# Patient Record
(demographics unavailable — no encounter records)

---

## 2019-07-07 NOTE — RADIOLOGY REPORT (SQ)
EXAM DESCRIPTION:



[facility]Lex: US PREGNANCY LIMITED   



CLINICAL HISTORY: 

25 years  Female;  cramping 

LMP 01/03/2019 (26 weeks 3 days)



TECHNIQUE: 

Transabdominal obstetrical ultrasound was performed.  



COMPARISON: 02/18/2019



FINDINGS:

Number of fetuses: Single

Fetal position: Vertex

BPD: 6.63 cm, 26 weeks 5 days

HC: 24.37 cm, 26 weeks 3 days

AC: 22.05 cm, 26 weeks 3 days

FL: 4.83 cm, 26 weeks 1 day

EFW: 931 g (2 lbs. 1 oz.), 47%

HR: 137 bpm

Anatomy: Grossly normal on this limited exam.

Amniotic fluid: KELLY 9.6 cm, adequate

Cervix: 3.7 cm

Placenta: Posterior fundal. No previa.



IMPRESSION:



1.  Single viable IUP

2.  No acute findings.

3.  EGA 26 weeks 3 days, EDC 10/10/2019

## 2019-08-24 NOTE — NON STRESS TEST REPORT
=================================================================

Non Stress Test

=================================================================

Datetime Report Generated by CPN: 08/24/2019 18:36

   

   

=================================================================

DEMOGRAPHIC

=================================================================

   

EGA NST:  33.2

   

=================================================================

INDICATION

=================================================================

   

Indication for Study:  Ordered by Provider

   

=================================================================

MONITORING

=================================================================

   

Monitor Explained:  Monitor Explained; Test Explained; Patient

   Verbalized Understanding

Time on Monitor:  08/24/2019 17:47

Time off Monitor:  08/24/2019 18:21

NST Duration:  34

   

=================================================================

NST INTERVENTIONS

=================================================================

   

NST Interventions:  None

Physician Notified NST:  Dr Posey-Janes

BABY A:  [id number]

   

=================================================================

BABY A

=================================================================

   

Fetal Movement :  Present

Contraction Frequency :  none

FHR Baseline :  135

Accelerations :  15X15

Decelerations :  None

Variability :  Moderate 6-25bpm

NST Review:  Meets Criteria for Reactive NST

NST Review and Verified By :  JOEY Escudero RN

NST Results:  Reactive

   

=================================================================

NST REPORT

=================================================================

   

Report Trigger:  Send Report

## 2019-10-15 NOTE — ADMISSION PHYSICAL
=================================================================



=================================================================

Datetime Report Generated by CPN: 10/15/2019 12:37

   

   

=================================================================

CURRENT ADMISSION

=================================================================

   

Prenatal Hx Assessment:  The Prenatal History has been Updated

Chief Complaint:  Uterine Contractions

Indication for Induction:  Not Applicable

Admit Impression :  Term, Intrauterine Pregnancy; Active Labor; Intact

   Membranes

Admit Plan:  Admit to Unit; Initiate Labor Protocol

   

=================================================================

ALLERGIES

=================================================================

   

Medication Allergies:  No

Medication Allergies:  No Known Allergies (2019)

Latex:  No Latex Allergies

Food Allergies:  No

Environmental Allergies:  No

   

=================================================================

OBSTETRICAL HISTORY

=================================================================

   

EDC:  10/10/2019 00:00

:  1

Para:  0

Term:  0

:  0

SAB:  0

IAB:  0

Ectopic:  0

Livin

Cesareans:  0

VBACs:  0

Multiple Births:  0

   

=================================================================

***SEE PRENATAL RECORDS***

=================================================================

   

Alcohol:  No

Marijuana :  No

Cocaine:  No

Other Illicit Drugs:  No

Cigarettes:  Never Smoker. [id number]

   

=================================================================

PHYSICAL EXAM

=================================================================

   

General:  Normal

HEENT:  Normal

Neurologic:  Normal

Thyroid:  Normal

Heart:  Normal

Lungs:  Normal

Breast:  Deferred

Back:  Normal

Abdomen:  Normal

Genitourinary Exam:  Normal

Extremities:  Normal

DTRs:  Normal

Pelvic Type:  Adequate

Physical Exam Comments:  G1 admitted to LD in labor,

   + Chlamydia 4-17= MIKA neg 19

   Rt breast lump, Dr. Mcguire, bx B9, surgery pp

   Hx migraines

   High pregnancy weight gain

   GBS neg

Vital Signs:  Reviewed

   

=================================================================

FETUS A

=================================================================

   

EGA:  40.5

Monitoring:  External US

Variability:  Moderate 6-25bpm

Accelerations:  15X15

FHR Category:  Category I

Admit Comment:  Admitted to  in labor, does not plan on epidural, Cat

   1 strip, irreg uc's,

   Plan: ROM anticipate 

   GBS neg

   Family at BS

   

=================================================================

PLANS FOR LABOR AND DELIVERY

=================================================================

   

Labor and Delivery:  None

Pain Management:  Natural; Medications

Feeding Preference:  Breast

Benefit of Breast Feed Discussed:  Yes

Circumcision:  N/A

   

=================================================================

INFORMED CONSENT

=================================================================

   

Assignment:  Tory Melgar MD

Signature:  Electronically signed by Kimberlyn Mills CNM on 10/15/2019 at

   12:36  with User ID: [email or web address]

:  Electronically signed by Kimberlyn Mills CNM on 10/15/2019 at 12:36  with

   User ID: [email or web address]

## 2019-10-15 NOTE — DELIVERY SUMMARY
=================================================================

Del Sum A-C

=================================================================

Datetime Report Generated by CPN: 10/15/2019 21:03

   

   

=================================================================

DELIVERY PERSONNEL

=================================================================

   

DELIVERY PERSONNEL:  [id number]

Delivery Doctor::  Tory Melgar MD

Labor and Delivery Nurse::  Ale Guerin RN

Labor and Delivery Nurse::  Yasmin Flannery RN

Scrub Tech/CNA:  [place] Trve[place], ST

   

=================================================================

MATERNAL INFORMATION

=================================================================

   

Delivery Anesthesia:  Epidural

Medications After Delivery:  Pitocin Drip 20 Units/1000ml NSS

Maternal Complications:  None

Provider Comments:   of a viable female  @ 1940 w/ an JULIET w/

   tight nuchal cord x 2 presentation;  APGARS 8, 9; anjelica 2nd deg

   periurethral and 2nd deg midline vag lac

   

=================================================================

LABOR SUMMARY

=================================================================

   

EDC:  10/10/2019 00:00

No. Babies in Womb:  1

 Attempted:  No

Labor Anesthesia:  Epidural

   

=================================================================

LABOR INFORMATION

=================================================================

   

Reason for Induction:  Not Applicable

Onset of Labor:  10/15/2019 11:42

Complete Dilatation:  10/15/2019 19:11

Oxytocin:  Augmentation

Group B Beta Strep:  Negative

Antibiotics # of Doses:  0

Antibiotics Time of Last Dose:  N/A

Name of Antibiotic Given:  N/A

Steroids Given:  None

Reason Steroids Not Administered:  Not Applicable

   

=================================================================

MEMBRANES

=================================================================

   

Membranes Rupture Method:  Spontaneous

Rupture of Membranes:  10/15/2019 15:10

Length of Rupture (hr):  4.55

Amniotic Fluid Color:  Clear

Amniotic Fluid Amount:  Moderate

Amniotic Fluid Odor:  Normal

   

=================================================================

STAGES OF LABOR

=================================================================

   

Stage 1 hr:  7

Stage 1 min:  29

Stage 2 hr:  0

Stage 2 min:  32

Stage 3 hr:  0

Stage 3 min:  37

Total Time in Labor hr:  8

Total Time in Labor min:  38

   

=================================================================

VAGINAL DELIVERY

=================================================================

   

Episiotomy:  None

Laceration #1:  Periurethral

Laceration Extension #1:  Second Degree

Laceration #2:  Periurethral

Laceration Extension #2:  Second Degree

Laceration #3:  Vaginal

Laceration Extension #3:  Second Degree

Laceration Repair:  Yes

Laceration Repair Note:  Bilateral 2nd deg periurethral lacs repaired

   w/3-0 chromic; 2nd deg midline vag lac repaired 

   w/2-0 vicryl

Sponge Count Correct:  Yes

Sharps Count Correct:  Yes

   

=================================================================

CSECTION DELIVERY

=================================================================

   

Primary Indication:  N/A

Secondary Indication:  N/A

CSection Incidence:  N/A

Labor:  N/A

Elective:  N/A

CSection Incision:  N/A

   

=================================================================

BABY A INFORMATION

=================================================================

   

Infant Delivery Date/Time:  10/15/2019 19:43

Method of Delivery:  Vaginal

Born in Route :  No

:  N/A

Forceps:  N/A

Vacuum Extraction:  N/A

Shoulder Dystocia :  No

   

=================================================================

PRESENTATION/POSITION BABY A

=================================================================

   

Presentation:  Cephalic

Cephalic Presentation:  Vertex

Vertex Position:  Right Occipital Anterior

Breech Presentation:  N/A

   

=================================================================

PLACENTA INFORMATION BABY A

=================================================================

   

Placenta Delivery Time :  10/15/2019 20:20

Placenta Method of Delivery:  Spontaneous

Placenta Status:  Delivered

   

=================================================================

APGAR SCORES BABY A

=================================================================

   

Heart Rate 1 min:  >100 bpm

Resp Effort 1 min:  Good Cry

Reflex Irritability 1 min:  Cough or Sneeze or Pulls Away

Muscle Tone 1 min:  Active Motion

Color 1 min:  Blue/Pale

APGAR SCORE 1 MIN:  8

Heart Rate 5 min:  >100 bpm

Resp Effort 5 min:  Good Cry

Reflex Irritability 5 min:  Cough or Sneeze or Pulls Away

Muscle Tone 5 min:  Active Motion

Color 5 min:  Body Pink, Extremities Blue

APGAR SCORE 5 MIN:  9

   

=================================================================

INFANT INFORMATION BABY A

=================================================================

   

Gestational Age at Delivery:  40.5

Gestational Status:  Full Term- 39- 40.6 Weeks

Infant Outcome :  Liveborn

Infant Condition :  Stable

Infant Sex:  Female

   

=================================================================

IDENTIFICATION BABY A

=================================================================

   

Infant Verification Date/Time:  10/15/2019 19:53

ID Band Number:  [id number]

Mother's Name Verified:  Yes

Infant Medical Record Number:  [id number]

RN Verifying Infant:  JUDI FlanneryNORA

Additional Verifying Personnel:  CHAO Keller, US

   

=================================================================

WEIGHT/LENGTH BABY A

=================================================================

   

Infant Birthweight (gm):  3473

Infant Weight (lb):  7

Infant Weight (oz):  11

Infant Length (in):  20.00

Infant Length (cm):  50.80

   

=================================================================

CORD INFORMATION BABY A

=================================================================

   

No. Cord Vessels:  3

Nuchal Cord :  Around Neck x2, Tight

Cord Blood Taken:  Yes-For Eval (Mom's Blood Type - or O+)

   

=================================================================

ASSESSMENT BABY A

=================================================================

   

Skin to Skin:  Yes

   

=================================================================

BABY B INFORMATION

=================================================================

   

 :  N/A

   

=================================================================

SIGNATURES

=================================================================

   

Signature:  Electronically signed by Tory Younger Eure, MD ([email or web address]) on

   10/15/2019 at 20:57  with User ID: [email or web address]

## 2019-10-15 NOTE — NON STRESS TEST REPORT
=================================================================

Non Stress Test

=================================================================

Datetime Report Generated by CPN: 10/15/2019 11:06

   

   

=================================================================

DEMOGRAPHIC

=================================================================

   

EGA NST:  40.5

   

=================================================================

INDICATION

=================================================================

   

Indication for Study:  Ordered by Provider

Indication for Study (NST) Other:  LABOR CHECK

   

=================================================================

MONITORING

=================================================================

   

Monitor Explained:  Monitor Explained; Test Explained; Patient

   Verbalized Understanding

Time on Monitor:  10/15/2019 10:18

Time on Monitor:  10/15/2019 10:17

Time off Monitor:  10/15/2019 10:47

NST Duration:  29

   

=================================================================

NST INTERVENTIONS

=================================================================

   

NST Interventions:  PO Hydration

Physician Notified NST:  J. Mills, CNM

BABY A:  [id number]

   

=================================================================

BABY A

=================================================================

   

Fetal Movement :  Present

Contraction Frequency :  Occasional

FHR Baseline :  135

Accelerations :  15X15

Decelerations :  Variable

Variability :  Moderate 6-25bpm

NST Review:  Meets Criteria for Reactive NST

NST Review and Verified By :  NORA NICOLE Results:  Reactive

   

=================================================================

NST REPORT

=================================================================

   

Report Trigger:  Send Report

## 2019-10-16 NOTE — PDOC PROGRESS REPORT
Subjective-OB


Progress Note for:: 10/16/19


Subjective: 


26yo G1 now P1 s/p  ppd1. Ambulating,breastfeeding and voiding without 

difficulty. Reports pain well controlled with medication. Denies any concerns 

today





Physical Exam (OB)


Vital Signs: 


                                        











Temp Pulse Resp BP Pulse Ox


 


 98.6 F   89   16   115/71   99 


 


 10/16/19 08:35  10/16/19 08:35  10/16/19 08:35  10/16/19 08:35  10/16/19 08:35








                                 Intake & Output











 10/15/19 10/16/19 10/17/19





 06:59 06:59 06:59


 


Intake Total  873 1000


 


Balance  873 1000


 


Weight  102.3 kg 














- General


General Appearance: Appears well


In distress: None





- PIH/Pre-Eclampsia


Headache: Absent


Epigastric Pain: No


Visual Changes: No





- Episiotomy/Laceration


Site Condition: Well Approximated





- Lochia


Lochia Amount: Small 10-25 ml





- Abdomen


Description: Soft





- Respiratory


Respiratory Status: No respiratory distress





- Extremities


Upper extremity: Normal inspection


Lower extremities: Normal inspection





- Neurological


Cognition: Normal


Orientation: AAOx4





- Psychological


Associated symptoms: Normal affect, Normal mood





Objective-Diagnostic


Laboratory: 


                                        





                                 10/16/19 08:08 





                                        











  10/15/19 10/15/19 10/15/19





  09:55 13:02 13:02


 


WBC   10.9 H 


 


RBC   4.52 


 


Hgb   13.4 


 


Hct   39.6 


 


MCV   88 


 


MCH   29.6 


 


MCHC   33.8 


 


RDW   13.4 


 


Plt Count   202 


 


Seg Neutrophils %   78.8 H 


 


Urine Color  YELLOW  


 


Urine Appearance  SLIGHTLY-CLOUDY  


 


Urine pH  6.0  


 


Ur Specific Gravity  1.014  


 


Urine Protein  30 H  


 


Urine Glucose (UA)  NEGATIVE  


 


Urine Ketones  NEGATIVE  


 


Urine Blood  LARGE H  


 


Urine Nitrite  NEGATIVE  


 


Ur Leukocyte Esterase  MODERATE H  


 


Blood Type    O POSITIVE


 


Antibody Screen    NEGATIVE














  10/16/19





  08:08


 


WBC  15.5 H


 


RBC  4.06


 


Hgb  11.8 L


 


Hct  35.3 L


 


MCV  87


 


MCH  29.0


 


MCHC  33.3


 


RDW  13.5


 


Plt Count  202


 


Seg Neutrophils % 


 


Urine Color 


 


Urine Appearance 


 


Urine pH 


 


Ur Specific Gravity 


 


Urine Protein 


 


Urine Glucose (UA) 


 


Urine Ketones 


 


Urine Blood 


 


Urine Nitrite 


 


Ur Leukocyte Esterase 


 


Blood Type 


 


Antibody Screen 














Assessment and Plan(PN)





- Assessment and Plan


(1) Vaginal delivery


Is this a current diagnosis for this admission?: Yes   


Plan: 


Routine pp care








(2) Periurethral laceration, delivered, current hospitalization


Is this a current diagnosis for this admission?: Yes   


Plan: 


 continue to monitor for s/s of infection








(3) Obstetric vaginal laceration


Qualifiers: 


   Perineal laceration presence: without perineal laceration   Qualified 

Code(s): O71.4 - Obstetric high vaginal laceration alone   


Is this a current diagnosis for this admission?: Yes   


Plan: 


continue to monitor for s/s of infection 








- Time Spent with Patient


Time with patient: Less than 15 minutes


Medications reviewed and adjusted accordingly: Yes





- Disposition


Anticipated Discharge: Home


Within: within 24 hours

## 2019-10-17 NOTE — PDOC PROGRESS REPORT
Subjective-OB


Progress Note for:: 10/17/19


Subjective: 





Doing well, no c/o, breastfeeding, packing to go home, baby in room





Physical Exam (OB)


Vital Signs: 


                                        











Temp Pulse Resp BP Pulse Ox


 


 98.0 F   89   16   104/62   100 


 


 10/17/19 09:01  10/17/19 09:01  10/17/19 09:01  10/17/19 09:01  10/17/19 09:01








                                 Intake & Output











 10/16/19 10/17/19 10/18/19





 06:59 06:59 06:59


 


Intake Total 873 1000 


 


Balance 873 1000 


 


Weight 102.3 kg  














- PIH/Pre-Eclampsia


Headache: Absent


Epigastric Pain: No


Visual Changes: No





- Lochia


Lochia Amount: Scant < 10 ml


Lochia Color: Rubra/Red





- Abdomen


Description: Soft


Hernia Present: No


Fundal Description: Firm, Midline


Fundal Height: u/u - u/2





Objective-Diagnostic


Laboratory: 


                                        





                                 10/16/19 08:08 











Assessment and Plan(PN)





- Assessment and Plan


(1) Vaginal delivery


Is this a current diagnosis for this admission?: Yes   





(2) Periurethral laceration, delivered, current hospitalization


Is this a current diagnosis for this admission?: Yes   





(3) Obstetric vaginal laceration


Qualifiers: 


   Perineal laceration presence: without perineal laceration   Qualified 

Code(s): O71.4 - Obstetric high vaginal laceration alone   


Is this a current diagnosis for this admission?: Yes   





- Time Spent with Patient


Time with patient: Less than 15 minutes


Medications reviewed and adjusted accordingly: Yes





- Disposition


Anticipated Discharge: Home


Within: within 24 hours

## 2019-10-17 NOTE — PDOC DISCHARGE SUMMARY
Impression





- Admit/DC Date/PCP


Admission Date/Primary Care Provider: 


  10/15/19 11:58





  MAURO SPEARS, DO





Discharge Date: 10/17/19





- Discharge Diagnosis


(1) Vaginal delivery


Is this a current diagnosis for this admission?: Yes   





(2) Periurethral laceration, delivered, current hospitalization


Is this a current diagnosis for this admission?: Yes   





(3) Obstetric vaginal laceration


Is this a current diagnosis for this admission?: Yes   





- Additional Information


Resuscitation Status: Full Code


Discharge Diet: Regular


Discharge Activity: Activity As Tolerated, No Lifting Over 10 Pounds, No tub 

bath


[facility] 


[facility] (Please call and schedule a 4 week 

follow up. )


Home Medications: 








Pnv No.95/Ferrous Fum/Folic AC [Prenatal Formula Tablet] 1 tab PO DAILY 07/07/19













HPI


Gestational Age: 40.5


Reason(s) for Admission: Onset of Labor


Prenatal Procedures: NST, Ultrasound


Prenatal Procedure(s) Note: 





Pitocin augmentation


Intrapartum Procedure(s): Spontaneous Vaginal Delivery


Postpartum Complication(s): Laceration-Vaginal, Laceration-Periurethral


Laceration-Degree: 2nd





Hospital Course


Hospital Course: 


routine





Results


Laboratory Results: 


                                        











WBC  15.5 10^3/uL (4.0-10.5)  H  10/16/19  08:08    


 


RBC  4.06 10^6/uL (3.72-5.28)   10/16/19  08:08    


 


Hgb  11.8 g/dL (12.0-15.5)  L  10/16/19  08:08    


 


Hct  35.3 % (36.0-47.0)  L  10/16/19  08:08    


 


MCV  87 fl (80-97)   10/16/19  08:08    


 


MCH  29.0 pg (27.0-33.4)   10/16/19  08:08    


 


MCHC  33.3 g/dL (32.0-36.0)   10/16/19  08:08    


 


RDW  13.5 % (11.5-14.0)   10/16/19  08:08    


 


Plt Count  202 10^3/uL (150-450)   10/16/19  08:08    


 


Lymph % (Auto)  15.9 % (13-45)   10/15/19  13:02    


 


Mono % (Auto)  4.1 % (3-13)   10/15/19  13:02    


 


Eos % (Auto)  0.9 % (0-6)   10/15/19  13:02    


 


Baso % (Auto)  0.3 % (0-2)   10/15/19  13:02    


 


Absolute Neuts (auto)  8.6 10^3/uL (1.7-8.2)  H  10/15/19  13:02    


 


Absolute Lymphs (auto)  1.7 10^3/uL (0.5-4.7)   10/15/19  13:02    


 


Absolute Monos (auto)  0.4 10^3/uL (0.1-1.4)   10/15/19  13:02    


 


Absolute Eos (auto)  0.1 10^3/uL (0.0-0.6)   10/15/19  13:02    


 


Absolute Basos (auto)  0.0 10^3/uL (0.0-0.2)   10/15/19  13:02    


 


Seg Neutrophils %  78.8 % (42-78)  H  10/15/19  13:02    


 


Urine Color  YELLOW   10/15/19  09:55    


 


Urine Appearance  SLIGHTLY-CLOUDY   10/15/19  09:55    


 


Urine pH  6.0  (5.0-9.0)   10/15/19  09:55    


 


Ur Specific Gravity  1.014   10/15/19  09:55    


 


Urine Protein  30 mg/dL (NEGATIVE)  H  10/15/19  09:55    


 


Urine Glucose (UA)  NEGATIVE mg/dL (NEGATIVE)   10/15/19  09:55    


 


Urine Ketones  NEGATIVE mg/dL (NEGATIVE)   10/15/19  09:55    


 


Urine Blood  LARGE  (NEGATIVE)  H  10/15/19  09:55    


 


Urine Nitrite  NEGATIVE  (NEGATIVE)   10/15/19  09:55    


 


Urine Bilirubin  NEGATIVE  (NEGATIVE)   10/15/19  09:55    


 


Urine Urobilinogen  NEGATIVE mg/dL (<2.0)   10/15/19  09:55    


 


Ur Leukocyte Esterase  MODERATE  (NEGATIVE)  H  10/15/19  09:55    


 


Urine Ascorbic Acid  NEGATIVE  (NEGATIVE)   10/15/19  09:55    


 


Urine Opiates Screen  NEGATIVE   10/15/19  09:55    


 


Urine Methadone Screen  NEGATIVE   10/15/19  09:55    


 


Ur Barbiturates Screen  NEGATIVE   10/15/19  09:55    


 


Ur Phencyclidine Scrn  NEGATIVE   10/15/19  09:55    


 


Ur Amphetamines Screen  NEGATIVE   10/15/19  09:55    


 


U Benzodiazepines Scrn  NEGATIVE   10/15/19  09:55    


 


Urine Cocaine Screen  NEGATIVE   10/15/19  09:55    


 


U Marijuana (THC) Screen  NEGATIVE   10/15/19  09:55    


 


RPR  NONREACTIVE  (NONREACTIVE)   10/15/19  13:02    


 


Blood Type  O POSITIVE   10/15/19  13:02    


 


Antibody Screen  NEGATIVE   10/15/19  13:02    














Plan


Health Concerns: 


none, resume PNV's


Plan of Treatment: 


routine


Goals: 


baby home with pt


Time Spent: Less than 30 Minutes

## 2020-12-14 NOTE — ER DOCUMENT REPORT
ED Medical Screen (RME)





- General


Chief Complaint: Abdominal Pain


Stated Complaint: NAUSEA


Time Seen by Provider: 12/14/20 17:05


TRAVEL OUTSIDE OF THE [place] IN LAST 30 DAYS: No





- HPI


Notes: 





Patient is a 26-year-old female who is 4 weeks pregnant and presents with 

abdominal cramping.  Patient reports nausea and vomiting but denies vaginal 

bleeding, vaginal discharge, chest pain, shortness of breath.  Last menstrual 

period was 11/15/2020.  G2, P1.  She has not seen OB for this pregnancy yet.








- Related Data


Allergies/Adverse Reactions: 


                                        





No Known Allergies Allergy (Verified 12/14/20 17:10)


   








Home Medications: prenatal





Past Medical History





- Social History


Frequency of alcohol use: None


Drug Abuse: Marijuana


Pulmonary Medical History: 


   Denies: Hx Asthma


Neurological Medical History: Reports: Hx Migraine


Renal/ Medical History: Denies: Hx Peritoneal Dialysis


GI Medical History: Denies: Hx Gastritis, Hx Gastroesophageal Reflux Disease


Musculoskeltal Medical History: Denies Hx Arthritis, Denies Hx Fibromyalgia, 

Denies Hx Muscular Dystrophy, Denies Hx Musculoskeletal Trauma


Psychiatric Medical History: 


   Denies: Hx Anxiety


Past Surgical History: Reports: Hx Oral Surgery - wisdom teeth removed





- Immunizations


Immunizations up to date: Yes


Hx Diphtheria, Pertussis, Tetanus Vaccination: Yes - 2012





Physical Exam





- Vital signs


Vitals: 





                                        











Temp Pulse Resp BP Pulse Ox


 


 98.5 F   89   20   130/80 H  98 


 


 12/14/20 16:12  12/14/20 16:12  12/14/20 16:12  12/14/20 16:12  12/14/20 16:12














- Abdominal


Distension: No distension


Tenderness: Nontender





Course





- Re-evaluation


Re-evalutation: 





I have greeted and performed a rapid initial assessment of this patient.  A 

comprehensive ED assessment and evaluation of the patient, analysis of test 

results and completion of medical decision making process will be conducted by 

an additional ED providers.





- Vital Signs


Vital signs: 





                                        











Temp Pulse Resp BP Pulse Ox


 


 98.5 F   89   20   130/80 H  98 


 


 12/14/20 16:12  12/14/20 16:12  12/14/20 16:12  12/14/20 16:12  12/14/20 16:12

## 2020-12-14 NOTE — RADIOLOGY REPORT (SQ)
EXAM DESCRIPTION:

 U/S OB TRANSVAG W/DOPPLER

[facility]: US PREGNANCY TRANSVAGINAL 



CLINICAL HISTORY: 

26 years  Female;  abd pain preg location unknown; beta-hCG 2320



TECHNIQUE: 

Endovaginal pelvic ultrasound was performed. 



COMPARISON: None.



FINDINGS:

Uterus: 10 x 5 x 5 cm. 

There is a very small cystic structure in the endometrial canal,

mean diameter 0.39 cm, 5 weeks 1 days if this is a gestational

sac. No fetal pole or yolk sac is identified. No hematoma.

Cervix 3 cm, closed



Right ovary: 3 x 2 x 2 cm. Normal vascular flow on Doppler.

Left ovary: 4 x 2 x 2 cm. Anechoic with structure with internal

echoes typical for corpus luteum cyst is noted. Normal vascular

flow on Doppler.

No free fluid. No adnexal masses. 



IMPRESSION:



1.  Possible intrauterine gestational sac, 5 weeks 1 day. No

fetal pole or yolk sac is identified.

2.  No adnexal masses

3.  Consider two-week follow-up

## 2020-12-14 NOTE — ER DOCUMENT REPORT
ED General





- General


Chief Complaint: Abdominal Pain


Stated Complaint: NAUSEA


Time Seen by Provider: 12/14/20 17:05


Notes: 





26-year-old female G2, P1 LMP 11/15/2020 presents with 1 day of intermittent 

mild left pelvic pain without associated symptoms.  Patient has been seen by OB 

but has not yet had an ultrasound confirming location of pregnancy.  Patient's 

previous pregnancy was uncomplicated.  Patient also endorses having had some 

mild nausea over the last few weeks but has not been vomiting and has been 

tolerating p.o. without difficulty.  Patient denies any vaginal bleeding, 

vaginal discharge, urinary symptoms, fever, lightheadedness, weakness, fainting,

ovarian cyst/STD hx, other GYN history


TRAVEL OUTSIDE OF THE [place] IN LAST 30 DAYS: No





- Related Data


Allergies/Adverse Reactions: 


                                        





No Known Allergies Allergy (Verified 12/14/20 17:10)


   








Home Medications: prenatal





Past Medical History





- General


Information source: Patient





- Social History


Smoking Status: Former Smoker


Frequency of alcohol use: None


Drug Abuse: Marijuana


Family History: Reviewed & Not Pertinent


Patient has homicidal ideation: No


Pulmonary Medical History: 


   Denies: Hx Asthma


Neurological Medical History: Reports: Hx Migraine


Renal/ Medical History: Denies: Hx Peritoneal Dialysis


GI Medical History: Denies: Hx Gastritis, Hx Gastroesophageal Reflux Disease


Musculoskeletal Medical History: Denies Hx Arthritis, Denies Hx Fibromyalgia, 

Denies Hx Muscular Dystrophy, Denies Hx Musculoskeletal Trauma


Psychiatric Medical History: 


   Denies: Hx Anxiety


Past Surgical History: Reports: Hx Oral Surgery - wisdom teeth removed





- Immunizations


Immunizations up to date: Yes


Hx Diphtheria, Pertussis, Tetanus Vaccination: Yes - 2012





Review of Systems





- Review of Systems


Notes: 





REVIEW OF SYSTEMS:


CONSTITUTIONAL :  Denies fever,  chills, or sweats. 


EENT: Denies recent cold/sinus symptoms, denies throat pain


CARDIOVASCULAR:  Denies chest pain, CHACHA


RESPIRATORY:  Denies cough, denies shortness of breath.


GASTROINTESTINAL:  Denies abdominal pain, +nausea -vomiting.


GENITOURINARY:  Denies difficulty urinating, painful urination.


FEMALE  GENITOURINARY:  Denies abnormal vaginal bleeding, vaginal discharge.


MUSCULOSKELETAL:  Denies neck pain, back pain.


SKIN:   Denies rash or skin lesions.


HEMATOLOGIC :   Denies easy bruising or bleeding.


LYMPHATIC:  Denies swollen, enlarged glands.


NEUROLOGICAL:  Denies headache, denies change in gait.


PSYCHIATRIC:  Denies anxiety or stress or depression.





Physical Exam





- Vital signs


Vitals: 





                                        











Temp Pulse Resp BP Pulse Ox


 


 98.5 F   89   20   130/80 H  98 


 


 12/14/20 16:12  12/14/20 16:12  12/14/20 16:12  12/14/20 16:12  12/14/20 16:12














- Notes


Notes: 





PHYSICAL EXAMINATION:


 


GENERAL: Well-appearing, well-nourished and in no acute distress.


 


HEAD: Atraumatic, normocephalic.


 


EYES: Pupils equal round and appropriate constriction, sclera anicteric, 

conjunctiva are normal.


 


ENT: nares patent, moist mucous membranes.


 


NECK: Normal range of motion, supple without lymphadenopathy


 


LUNGS: Breath sounds clear to auscultation bilaterally and equal.  No wheezes 

rales or rhonchi.


 


HEART: Regular rate and rhythm without murmurs


 


ABDOMEN: Soft, nontender, no guarding, no masses, no CVAT.  Mild left pelvic 

discomfort with deep palpation without any masses, rebound, or guarding.  

Patient declined pelvic exam.


 


EXTREMITIES: Normal range of motion, no pitting or edema.  No cyanosis.


 


NEUROLOGICAL: Awake, alert, conversing appropriately, moves all extremities 

spontaneously.


 


PSYCH: Normal mood, normal affect.


 


SKIN: Warm, Dry, normal turgor, no rashes or lesions noted.





Course





- Re-evaluation


Re-evalutation: 





12/14/20 23:25


Patient with pregnancy of unknown location and a highly desired pregnancy with 

left pelvic pain.  Patient's pelvic pain is mild but patient was understandably 

concerned prompting ED visit but pain is well controlled and ultrasound findings

are equivocal.  Unable to definitively confirm IUP and there is a cyst in the 

left ovary that could be an early ectopic but given that pregnancy is desired 

this is appropriate for close follow-up with 48-hour beta and likely repeat 

ultrasound.  I spoke to patient at length about ectopic pregnancy and that it 

was a potentially deadly condition and all of the signs and symptoms that should

prompt her to come back to the emergency room.  Patient was in agreement and 

felt comfortable with this plan and denied having any other questions or 

concerns.  I stressed that if she is unable to follow-up with OB then she should

come back to the emergency department.  Patient is ready for discharge with 

close outpatient or ED follow-up.  Patient is on prenatals prescribed by OB.  

Patient declined pelvic exam which is reasonable as it is unlikely to add 

meaningful clinical information as patient is able to reliably report symptoms.





- Vital Signs


Vital signs: 





                                        











Temp Pulse Resp BP Pulse Ox


 


 98.5 F   89   20   130/80 H  98 


 


 12/14/20 16:12  12/14/20 16:12  12/14/20 16:12  12/14/20 16:12  12/14/20 16:12














- Laboratory Results


Result Diagrams: 


                                 12/14/20 17:17





                                 12/14/20 17:17


Laboratory Results Interpreted: 





                                        











  12/14/20 12/14/20





  17:17 17:17


 


Beta HCG, Quant  2320.80 H 


 


Ur Leukocyte Esterase   TRACE H











Critical Laboratory Results Reviewed: No Critical Results





- Radiology Results


Critical Radiology Results Reviewed: No Critical Results





Discharge





- Discharge


Clinical Impression: 


 Pregnancy of unknown anatomic location





Pelvic pain affecting pregnancy


Qualifiers:


 Trimester: first trimester Qualified Code(s): O26.891 - Other specified 

pregnancy related conditions, first trimester; R10.2 - Pelvic and perineal pain





Disposition: HOME, SELF-CARE


Additional Instructions: 


Pelvic Pain in Pregnancy





 Lower abdominal pain during pregnancy can have many causes.  We look for se

rious causes such as appendicitis, tubal pregnancy, miscarriage, placental 

separation, or urinary tract infection.  Less serious causes of pain include 

corpus luteum cyst (ovarian cyst of pregnancy) or stretching of the pelvic 

tissues by the enlarging uterus. Sometimes the pain comes from the bowels.








It is still possible that you have an ectopic pregnancy which is a potentially 

deadly problem and it is extremely important that you follow-up as we discussed.

 Follow-up in your obstetrician's office in 2 days without fail to have repeat 

examination and blood work and possibly ultrasound.  If you are not able to 

follow-up with your OB in 2 days then return to the emergency department.  If at

any point you have any worsening pain, dizziness, lightheadedness, weakness, 

fainting, vaginal bleeding, or any other worsening of symptoms is extremely 

important that you return immediately to the emergency department.

## 2020-12-16 NOTE — ER DOCUMENT REPORT
ED GI/





- General


Chief Complaint: Abdominal Pain


Stated Complaint: CRAMPING, 4WKS PREG


Time Seen by Provider: 20 06:06


Notes: 





HPI: 26-year-old female -0-0-1 at around 4 to 5 weeks by last menstrual 

period who presents with some left lower quadrant pain starting 2 days ago.  No 

vaginal bleeding.  Nausea with 2 bouts of vomiting.  No dysuria or diarrhea.  No

real aggravating relieving factors to this pain.








ROS:  See HPI


All other review of systems reviewed and otherwise negative





Reviewed vital signs and nursing note as charted by RN.





PHYSICAL EXAM: 





CONSTITUTIONAL: Alert and oriented and responds appropriately to questions. 

Well-appearing; well-nourished





HEAD: Normocephalic; atraumatic





EYES: Sclera is not pale





CARD: Regular rate and rhythm; no murmurs; symmetric distal pulses





RESP: Normal chest excursion without splinting or tachypnea; breath sounds clear

and equal bilaterally





ABD/GI: Normal bowel sounds; non-distended; soft, no focal tenderness to 

palpation of all 4 quadrants of the abdomen including bilateral lower quadrants.

 No palpable masses present





BACK: The back appears normal and is non-tender to palpation





EXT: Normal ROM in all joints; no edema





SKIN: No acute lesions noted





NEURO: CN 2-12 intact; 5/5 bilateral upper and lower extremity strength with 

sensation intact to light touch





PSYCH: The patient's mood and manner are appropriate. Grooming and personal 

hygiene are appropriate.


TRAVEL OUTSIDE OF THE [place] IN LAST 30 DAYS: No





- Related Data


Allergies/Adverse Reactions: 


                                        





No Known Allergies Allergy (Verified 20 05:34)


   








Home Medications: PRE-NATALS





Past Medical History





- Social History


Smoking Status: Never Smoker


Frequency of alcohol use: None


Drug Abuse: None


Family History: Reviewed & Not Pertinent


Pulmonary Medical History: 


   Denies: Hx Asthma


Neurological Medical History: Reports: Hx Migraine


Renal/ Medical History: Denies: Hx Peritoneal Dialysis


GI Medical History: Denies: Hx Gastritis, Hx Gastroesophageal Reflux Disease


Musculoskeletal Medical History: Denies Hx Arthritis, Denies Hx Fibromyalgia, 

Denies Hx Muscular Dystrophy, Denies Hx Musculoskeletal Trauma


Psychiatric Medical History: 


   Denies: Hx Anxiety


Past Surgical History: Reports: Hx Oral Surgery - wisdom teeth removed





- Immunizations


Immunizations up to date: Yes


Hx Diphtheria, Pertussis, Tetanus Vaccination: Yes - 





Physical Exam





- Vital signs


Vitals: 


                                        











Temp Pulse Resp BP Pulse Ox


 


 98.2 F   84   16   121/68   98 


 


 20 05:26  20 05:26  20 05:26  20 05:26  20 05:26














Course





- Re-evaluation


Re-evalutation: 





20 06:22


Given the history and physical in early pregnancy with no vaginal bleeding with 

left lower quadrant pain we will obtain a quantitative hCG as well as a 

transvaginal ultrasound and a urine analysis.  We would like to evaluate for the

possibility of early IUP, topic pregnancy, molar pregnancy, or ovarian 

pathology.





20 07:23


Labs as recorded.  Quantitative hCG and ultrasound as recorded.  Vital signs are

stable.  Pelvic examination shows no obvious external or internal lesions.  No 

cervical motion tenderness.  Closed os with no blood in the vault.  Patient has 

no signs or symptoms of a urinary tract infection currently.  21 white blood 

cells noted.  Urine culture has been sent.  We will hold on treatment at this 

moment.





20 07:34


Wet prep as recorded.  Given the above history and physical examination with 

vital signs as recorded, the repeat exam showing no pain, patient will be 

discharged home with strict return precautions and follow-up with the OB/GYN.





- Vital Signs


Vital signs: 


                                        











Temp Pulse Resp BP Pulse Ox


 


 98.2 F   84   16   121/68   98 


 


 20 05:26  20 05:26  20 05:26  20 05:26  20 05:26














- Laboratory Results


Result Diagrams: 


                                 20 06:00





                                 20 06:00


Laboratory Results Interpreted: 


                                        











  20





  06:00 06:20


 


Sodium  136.6 L 


 


Beta HCG, Quant  4617.00 H 


 


Urine Urobilinogen   2.0 H


 


Ur Leukocyte Esterase   SMALL H











Critical Laboratory Results Reviewed: No Critical Results





- Radiology Results


Critical Radiology Results Reviewed: No Critical Results





Discharge





- Discharge


Clinical Impression: 


 Abdominal cramping affecting pregnancy





Condition: Good


Disposition: HOME, SELF-CARE


Additional Instructions: 


Come back immediately for any increased pain, change in location or quality of 

pain, lightheadedness or dizziness, worsening nausea or vomiting, vaginal 

bleeding, fevers, or any other acute problems.  Please follow-up with the 

primary care physician for reassessment as discussed.

## 2020-12-16 NOTE — RADIOLOGY REPORT (SQ)
Ultrasound OB transvaginal on 12/16/2020 at 6:27 AM



CLINICAL INDICATION: Severe pelvic pain, pregnant



COMPARISON: 12/14/2020



FINDINGS: Multiple sonographic images are obtained throughout the

pelvis by transvaginal approach, both transverse and sagittal

images are obtained. Uterus measures approximately 9.6 x 5.2 x

6.5 cm. The right ovary measures approximately 3.5 x 1.8 x 1.8

cm. Flow is demonstrated in the right ovary. Left ovary measures

approximately 3.8 x 2.3 x 2.2 cm. Flow is demonstrated in the

left ovary. No adnexal mass or fluid collection is noted. There

is a very small early intrauterine gestational sac with

surrounding decidual reaction. Estimated gestational age by mean

sac diameter is an approximate five week two day gestation. No

yolk sac or fetal pole is noted at this time. Gestation is too

early for placental evaluation. Gestational sac shape appears

unremarkable. Small calcified uterine fibroid is noted.



IMPRESSION: Single early approximate five week two day

intrauterine pregnancy.